# Patient Record
Sex: MALE | Race: OTHER | HISPANIC OR LATINO | URBAN - METROPOLITAN AREA
[De-identification: names, ages, dates, MRNs, and addresses within clinical notes are randomized per-mention and may not be internally consistent; named-entity substitution may affect disease eponyms.]

---

## 2019-02-01 ENCOUNTER — EMERGENCY (EMERGENCY)
Facility: HOSPITAL | Age: 24
LOS: 1 days | Discharge: HOME | End: 2019-02-01
Attending: EMERGENCY MEDICINE
Payer: MEDICAID

## 2019-02-01 VITALS
OXYGEN SATURATION: 96 % | TEMPERATURE: 98 F | HEIGHT: 72 IN | WEIGHT: 184.97 LBS | DIASTOLIC BLOOD PRESSURE: 74 MMHG | SYSTOLIC BLOOD PRESSURE: 124 MMHG | HEART RATE: 79 BPM | RESPIRATION RATE: 16 BRPM

## 2019-02-01 DIAGNOSIS — S62.525A NONDISPLACED FRACTURE OF DISTAL PHALANX OF LEFT THUMB, INITIAL ENCOUNTER FOR CLOSED FRACTURE: ICD-10-CM

## 2019-02-01 DIAGNOSIS — M79.645 PAIN IN LEFT FINGER(S): ICD-10-CM
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
## 2019-02-01 PROCEDURE — 99284 EMERGENCY DEPT VISIT MOD MDM: CPT | Mod: 25

## 2019-02-01 PROCEDURE — 73140 X-RAY EXAM OF FINGER(S): CPT | Mod: 26,LT

## 2019-02-01 PROCEDURE — 26750 TREAT FINGER FRACTURE EACH: CPT | Mod: 54

## 2019-02-01 RX ORDER — IBUPROFEN 200 MG
600 TABLET ORAL ONCE
Qty: 0 | Refills: 0 | Status: COMPLETED | OUTPATIENT
Start: 2019-02-01 | End: 2019-02-01

## 2019-02-01 RX ADMIN — Medication 600 MILLIGRAM(S): at 19:45

## 2019-02-01 NOTE — ED PROVIDER NOTE - CARE PLAN
Principal Discharge DX:	Closed nondisplaced fracture of distal phalanx of left thumb, initial encounter

## 2019-02-01 NOTE — ED ADULT NURSE NOTE - OBJECTIVE STATEMENT
23 y.o male with no pmh complains of left 1st finger pain with bleeding under the nail after working on a car.

## 2019-02-01 NOTE — ED PROVIDER NOTE - CARE PROVIDER_API CALL
Get Iyer (MD)  Orthopaedic Surgery; Surgery of the Hand  [address], [address]  [place] [address]  Phone: [phone]  Fax: [phone]

## 2019-02-01 NOTE — ED PROVIDER NOTE - NEUROLOGICAL, MLM
Right hand no focal deficits, no motor or sensory deficits. Left hand no focal deficits, no motor or sensory deficits.

## 2019-02-01 NOTE — ED PROVIDER NOTE - MUSCULOSKELETAL, MLM
R thumb range of motion is not limited, no muscle or joint tenderness L thumb range of motion is not limited, + distal tip tenderness

## 2019-02-08 PROBLEM — Z00.00 ENCOUNTER FOR PREVENTIVE HEALTH EXAMINATION: Status: ACTIVE | Noted: 2019-02-08

## 2019-02-11 ENCOUNTER — APPOINTMENT (OUTPATIENT)
Dept: ORTHOPEDIC SURGERY | Facility: CLINIC | Age: 24
End: 2019-02-11

## 2019-02-21 ENCOUNTER — APPOINTMENT (OUTPATIENT)
Dept: ORTHOPEDIC SURGERY | Facility: CLINIC | Age: 24
End: 2019-02-21
Payer: OTHER MISCELLANEOUS

## 2019-02-21 DIAGNOSIS — S62.523A DISPLACED FRACTURE OF DISTAL PHALANX OF UNSPECIFIED THUMB, INITIAL ENCOUNTER FOR CLOSED FRACTURE: ICD-10-CM

## 2019-02-21 PROCEDURE — 99203 OFFICE O/P NEW LOW 30 MIN: CPT

## 2019-02-21 PROCEDURE — 73140 X-RAY EXAM OF FINGER(S): CPT | Mod: FA

## 2021-06-07 NOTE — ED PROVIDER NOTE - CHIEF COMPLAINT
[FreeTextEntry3] : I, Yunier Martinez, acted solely as a scribe for Dr. Bassem Rao on this date 06/07/2021. 
The patient is a 23y Male complaining of finger pain/injury.

## 2022-10-11 ENCOUNTER — EMERGENCY (EMERGENCY)
Facility: HOSPITAL | Age: 27
LOS: 0 days | Discharge: HOME | End: 2022-10-11
Payer: COMMERCIAL

## 2022-10-11 VITALS
TEMPERATURE: 98 F | RESPIRATION RATE: 16 BRPM | DIASTOLIC BLOOD PRESSURE: 86 MMHG | OXYGEN SATURATION: 98 % | WEIGHT: 199.96 LBS | HEIGHT: 72 IN | SYSTOLIC BLOOD PRESSURE: 125 MMHG | HEART RATE: 91 BPM

## 2022-10-11 DIAGNOSIS — R07.89 OTHER CHEST PAIN: ICD-10-CM
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
## 2022-10-11 DIAGNOSIS — M25.511 PAIN IN RIGHT SHOULDER: ICD-10-CM

## 2022-10-11 DIAGNOSIS — M25.532 PAIN IN LEFT WRIST: ICD-10-CM
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
## 2022-10-11 DIAGNOSIS — Z91.018 ALLERGY TO OTHER FOODS: ICD-10-CM

## 2022-10-11 PROCEDURE — 99284 EMERGENCY DEPT VISIT MOD MDM: CPT

## 2022-10-11 PROCEDURE — 73030 X-RAY EXAM OF SHOULDER: CPT | Mod: 26,RT

## 2022-10-11 PROCEDURE — 73130 X-RAY EXAM OF HAND: CPT | Mod: 26,LT

## 2022-10-11 PROCEDURE — 71045 X-RAY EXAM CHEST 1 VIEW: CPT | Mod: 26

## 2022-10-11 PROCEDURE — 73110 X-RAY EXAM OF WRIST: CPT | Mod: 26,LT

## 2022-10-11 RX ORDER — ACETAMINOPHEN 500 MG
650 TABLET ORAL ONCE
Refills: 0 | Status: COMPLETED | OUTPATIENT
Start: 2022-10-11 | End: 2022-10-11

## 2022-10-11 RX ADMIN — Medication 650 MILLIGRAM(S): at 21:16
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
## 2022-10-11 NOTE — ED PROVIDER NOTE - PROGRESS NOTE DETAILS
XRs neg on wet read.  Patient placed in velcro wrist splint, will f/u with pcp and ortho.  Patient well appearing, ambulating with steady gait without complain of other pain or discomfort.  Return precautions provided and patient agreeable.

## 2022-10-11 NOTE — ED PROVIDER NOTE - CARE PROVIDER_API CALL
Sarmad Reeves)  Orthopaedic Surgery  [address], [address]  [place] [address]  Phone: [phone]  Fax: [phone]  Follow Up Time:     Gail Shah)  Orthopaedic Surgery; Surgery of the Hand  [address] [place] [address]  Phone: [phone]  Fax: [phone]  Follow Up Time:
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
## 2022-10-11 NOTE — ED PROVIDER NOTE - CARE PROVIDERS DIRECT ADDRESSES
[email or web address]
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
## 2022-10-12 ENCOUNTER — FORM ENCOUNTER (OUTPATIENT)
Age: 27
End: 2022-10-12

## 2022-10-13 ENCOUNTER — APPOINTMENT (OUTPATIENT)
Dept: MRI IMAGING | Facility: CLINIC | Age: 27
End: 2022-10-13

## 2022-10-13 ENCOUNTER — APPOINTMENT (OUTPATIENT)
Dept: ORTHOPEDIC SURGERY | Facility: CLINIC | Age: 27
End: 2022-10-13

## 2022-10-13 VITALS — WEIGHT: 200 LBS | HEIGHT: 72 IN | BODY MASS INDEX: 27.09 KG/M2

## 2022-10-13 DIAGNOSIS — S69.92XA UNSPECIFIED INJURY OF LEFT WRIST, HAND AND FINGER(S), INITIAL ENCOUNTER: ICD-10-CM

## 2022-10-13 DIAGNOSIS — S20.212A CONTUSION OF LEFT FRONT WALL OF THORAX, INITIAL ENCOUNTER: ICD-10-CM

## 2022-10-13 DIAGNOSIS — S40.012A CONTUSION OF LEFT SHOULDER, INITIAL ENCOUNTER: ICD-10-CM

## 2022-10-13 PROCEDURE — 73110 X-RAY EXAM OF WRIST: CPT | Mod: LT

## 2022-10-13 PROCEDURE — 73221 MRI JOINT UPR EXTREM W/O DYE: CPT | Mod: LT

## 2022-10-13 PROCEDURE — 99203 OFFICE O/P NEW LOW 30 MIN: CPT

## 2022-10-13 PROCEDURE — 71100 X-RAY EXAM RIBS UNI 2 VIEWS: CPT | Mod: LT

## 2022-10-13 NOTE — PHYSICAL EXAM
[Sitting] : sitting [Standing] : standing [Mild] : mild [Dorsal Wrist] : dorsal wrist [Distal Radius] : distal radius [Anatomic Snuff Box] : anatomic snuff box [Scapholunate joint] : scapholunate joint [Wrist Dorsiflexion] : wrist dorsiflexion [Wrist Volarflexion] : wrist volarflexion [Radial Deviation] : radial deviation [Ulnar Deviation] : ulnar deviation [No bony abnormalities] : No bony abnormalities [] : no nail deformity [Left] : left wrist [FreeTextEntry8] : ?distal pole scaphoid fx? only seen on one view
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
## 2022-10-14 ENCOUNTER — APPOINTMENT (OUTPATIENT)
Dept: ORTHOPEDIC SURGERY | Facility: CLINIC | Age: 27
End: 2022-10-14

## 2022-10-14 PROCEDURE — 99024 POSTOP FOLLOW-UP VISIT: CPT

## 2022-10-14 PROCEDURE — 29075 APPL CST ELBW FNGR SHORT ARM: CPT | Mod: LT

## 2022-10-14 PROCEDURE — 25622 CLTX CARPL SCPHD FX W/O MNPJ: CPT

## 2022-10-14 NOTE — REASON FOR VISIT
[FreeTextEntry2] : 10/14/22- Had MRI : Non displaced oblique fx scaphoid distal 1/3, radial tfc tear, diffuse soft tissue edema with partial tear volar ext lig, and sprain dorsal ext lig

## 2022-10-14 NOTE — DISCUSSION/SUMMARY
[de-identified] : MRI reviewed, placed in thumb spica cast. May take 2-3 months to heal. Will repeat xray in 4 weeks

## 2022-10-14 NOTE — PHYSICAL EXAM
[No bony abnormalities] : No bony abnormalities [Sitting] : sitting [Standing] : standing [Mild] : mild [Dorsal Wrist] : dorsal wrist [Distal Radius] : distal radius [Anatomic Snuff Box] : anatomic snuff box [Scapholunate joint] : scapholunate joint [Wrist Dorsiflexion] : wrist dorsiflexion [Wrist Volarflexion] : wrist volarflexion [Radial Deviation] : radial deviation [Ulnar Deviation] : ulnar deviation [Left] : left wrist [] : no nail deformity [FreeTextEntry8] : ?distal pole scaphoid fx? only seen on one view
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
## 2022-10-22 ENCOUNTER — TRANSCRIPTION ENCOUNTER (OUTPATIENT)
Age: 27
End: 2022-10-22

## 2022-11-11 ENCOUNTER — RESULT CHARGE (OUTPATIENT)
Age: 27
End: 2022-11-11

## 2022-11-11 ENCOUNTER — NON-APPOINTMENT (OUTPATIENT)
Age: 27
End: 2022-11-11

## 2022-11-11 ENCOUNTER — APPOINTMENT (OUTPATIENT)
Dept: ORTHOPEDIC SURGERY | Facility: CLINIC | Age: 27
End: 2022-11-11

## 2022-11-11 VITALS — BODY MASS INDEX: 27.09 KG/M2 | HEIGHT: 72 IN | WEIGHT: 200 LBS

## 2022-11-11 PROCEDURE — 99213 OFFICE O/P EST LOW 20 MIN: CPT

## 2022-11-11 PROCEDURE — 73110 X-RAY EXAM OF WRIST: CPT | Mod: LT

## 2022-11-11 NOTE — PHYSICAL EXAM
[No bony abnormalities] : No bony abnormalities [Anatomic Snuff Box] : anatomic snuff box [Wrist Dorsiflexion] : wrist dorsiflexion [Wrist Volarflexion] : wrist volarflexion [Radial Deviation] : radial deviation [Ulnar Deviation] : ulnar deviation [Left] : left wrist [There are no fractures, subluxations or dislocations. No significant abnormalities are seen] : There are no fractures, subluxations or dislocations. No significant abnormalities are seen [] : no nail deformity [de-identified] : improving
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
## 2022-11-11 NOTE — REASON FOR VISIT
[FreeTextEntry2] : 11/11/2022: Pt reports that his pain has improved significantly since last visit.  Pt removed his own cast at home a few days ago on 11/7.  Pt reports that he only experiences pain with pedro weight bearing.\par 10/14/22- Had MRI : Non displaced oblique fx scaphoid distal 1/3, radial tfc tear, diffuse soft tissue edema with partial tear volar ext lig, and sprain dorsal ext lig

## 2022-11-11 NOTE — DISCUSSION/SUMMARY
[de-identified] : Needs further protection- given prescription for thumb spica splint. No lifting more than 25 pounds

## 2022-12-08 ENCOUNTER — APPOINTMENT (OUTPATIENT)
Dept: ORTHOPEDIC SURGERY | Facility: CLINIC | Age: 27
End: 2022-12-08

## 2022-12-08 VITALS — BODY MASS INDEX: 27.09 KG/M2 | HEIGHT: 72 IN | WEIGHT: 200 LBS

## 2022-12-08 DIAGNOSIS — S62.015A NONDISPLACED FRACTURE OF DISTAL POLE OF NAVICULAR [SCAPHOID] BONE OF LEFT WRIST, INITIAL ENCOUNTER FOR CLOSED FRACTURE: ICD-10-CM

## 2022-12-08 PROCEDURE — 73110 X-RAY EXAM OF WRIST: CPT | Mod: LT

## 2022-12-08 PROCEDURE — 99213 OFFICE O/P EST LOW 20 MIN: CPT

## 2022-12-08 NOTE — DISCUSSION/SUMMARY
[de-identified] : His only restriction is pushups as cannot bear the weight when wrist is maximally dorsiflexed

## 2022-12-08 NOTE — REASON FOR VISIT
[FreeTextEntry2] : 12/8/22- Left wrist pain is improving, still has pain when axial pressure is applied to thumb or wrist, so cannot do pushup of yet\par 11/11/2022: Pt reports that his pain has improved significantly since last visit.  Pt removed his own cast at home a few days ago on 11/7.  Pt reports that he only experiences pain with pedro weight bearing.\par 10/14/22- Had MRI : Non displaced oblique fx scaphoid distal 1/3, radial tfc tear, diffuse soft tissue edema with partial tear volar ext lig, and sprain dorsal ext lig

## 2022-12-08 NOTE — PHYSICAL EXAM
[Wrist Dorsiflexion] : wrist dorsiflexion [Wrist Volarflexion] : wrist volarflexion [Radial Deviation] : radial deviation [Ulnar Deviation] : ulnar deviation [Left] : left wrist [] : no pain with range of motion [de-identified] : snuffbox tenderness minimal [FreeTextEntry8] : distal pole scaphoid fx healing well [TWNoteComboBox7] : dorsiflexion 70 degrees [TWNoteComboBox4] : volarflexion 60 degrees
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
## 2023-01-23 ENCOUNTER — APPOINTMENT (OUTPATIENT)
Dept: ORTHOPEDIC SURGERY | Facility: CLINIC | Age: 28
End: 2023-01-23

## 2023-03-06 NOTE — ED PROVIDER NOTE - NSICDXFAMILYHX_GEN_ALL_CORE_FT
Quality 130: Documentation Of Current Medications In The Medical Record: Current Medications Documented Detail Level: Detailed Quality 226: Preventive Care And Screening: Tobacco Use: Screening And Cessation Intervention: Patient screened for tobacco use and is an ex/non-smoker Quality 431: Preventive Care And Screening: Unhealthy Alcohol Use - Screening: Patient not identified as an unhealthy alcohol user when screened for unhealthy alcohol use using a systematic screening method Quality 110: Preventive Care And Screening: Influenza Immunization: Influenza Immunization Ordered or Recommended, but not Administered due to system reason FAMILY HISTORY:  No pertinent family history in first degree relatives